# Patient Record
Sex: FEMALE | Race: WHITE
[De-identification: names, ages, dates, MRNs, and addresses within clinical notes are randomized per-mention and may not be internally consistent; named-entity substitution may affect disease eponyms.]

---

## 2018-03-20 ENCOUNTER — HOSPITAL ENCOUNTER (OUTPATIENT)
Dept: HOSPITAL 89 - RAD | Age: 38
End: 2018-03-20
Attending: ORTHOPAEDIC SURGERY
Payer: COMMERCIAL

## 2018-03-20 DIAGNOSIS — M25.552: Primary | ICD-10-CM

## 2018-03-20 NOTE — RADIOLOGY IMAGING REPORT
FACILITY: [facility] 

 

PATIENT NAME: Kamini Turner

: 1980

MR: [id number]

V: [id number]

EXAM DATE: 

ORDERING PHYSICIAN: [facility]

TECHNOLOGIST: 

 

Location: [facility]

Patient: Kamini Turner

: 1980

MRN: [id number]

Visit/Account:[id number]

Date of Sevice:  3/20/2018

 

ACCESSION #: [id number]

 

AP pelvis, one view, and left hip, one view.

 

HISTORY: Left anterior hip pain when running.

 

COMPARISON: None.

 

The bony pelvis is unremarkable. The hips are normally aligned.  No joint space narrowing.  The sacro
iliac joints and pubic symphysis are unremarkable. No acute fractures are identified.

 

IMPRESSION:

 

Negative for acute bony abnormality.

 

Report Dictated By: Jamal Key MD at 3/20/2018 1:21 PM

 

Report E-Signed By: Jamal Key MD  at 3/20/2018 1:22 PM

 

WSN:[id number]

## 2018-03-21 ENCOUNTER — HOSPITAL ENCOUNTER (OUTPATIENT)
Dept: HOSPITAL 89 - LAB | Age: 38
End: 2018-03-21
Attending: ORTHOPAEDIC SURGERY
Payer: COMMERCIAL

## 2018-03-21 ENCOUNTER — HOSPITAL ENCOUNTER (OUTPATIENT)
Dept: HOSPITAL 89 - ZZHEALTHFA | Age: 38
End: 2018-03-21

## 2018-03-21 DIAGNOSIS — Z02.9: Primary | ICD-10-CM

## 2018-03-21 DIAGNOSIS — M25.552: Primary | ICD-10-CM

## 2018-03-21 LAB
LDLC SERPL-MCNC: 76 MG/DL
PLATELET COUNT, AUTOMATED: 176 K/UL (ref 150–450)

## 2018-03-21 PROCEDURE — 82306 VITAMIN D 25 HYDROXY: CPT

## 2018-04-26 ENCOUNTER — HOSPITAL ENCOUNTER (OUTPATIENT)
Dept: HOSPITAL 89 - MRI | Age: 38
End: 2018-04-26
Attending: PHYSICIAN ASSISTANT
Payer: COMMERCIAL

## 2018-04-26 DIAGNOSIS — S73.102A: Primary | ICD-10-CM

## 2018-04-26 PROCEDURE — 77002 NEEDLE LOCALIZATION BY XRAY: CPT

## 2018-04-26 PROCEDURE — 73722 MRI JOINT OF LWR EXTR W/DYE: CPT

## 2018-04-26 NOTE — RADIOLOGY IMAGING REPORT
FACILITY: [facility] 

 

PATIENT NAME: Kamini Turner

: 1980

MR: [id number]

V: [id number]

EXAM DATE: 

ORDERING PHYSICIAN: PIETRO TURNER

TECHNOLOGIST: 

 

Location: [facility]

Patient: Kamini Turner

: 1980

MRN: [id number]

Visit/Account:[id number]

Date of Sevice:  2018

 

ACCESSION #: [id number]

 

HIP LEFT W CONTRAST

 

HISTORY: Hip pain

 

COMPARISON: None

 

TECHNIQUE: Multiplanar/multisequence was obtained through the left hip after the injection of intra-a
rticular dilute gadolinium which was performed separately from this exam.

 

FINDINGS:

 

Left hip:

There is good distention of the hip joint with dilute gadolinium.

Joint space: Normal

Bone marrow: Normal

Labrum: Short segment nondisplaced tearing of the anterior labrum (sagittal image 11). No para labral
 cyst.

Other findings: None significant

 

Limited images of the right hip:

Joint space: Normal

Bone marrow: Normal

Effusion: None

Other findings: None significant

 

Bony pelvis: Normal

Pubic symphysis and SI joints: Normal

Myotendinous structures: Normal

Intrapelvic and lower abdominal findings: None significant

Visualized spine: Normal

Other findings: None significant

 

IMPRESSION:

 

1. Left hip shows short segment nondisplaced tearing of the anterior labrum. No para labral cyst.

 

Report Dictated By: Anup Mosquera MD at 2018 12:26 PM

 

Report E-Signed By: Anup Mosquera MD  at 2018 12:30 PM

 

WSN:[id number]

## 2018-04-26 NOTE — RADIOLOGY IMAGING REPORT
FACILITY: [facility] 

 

PATIENT NAME: Kamini Turner

: 1980

MR: [id number]

V: [id number]

EXAM DATE: [id number]

ORDERING PHYSICIAN: PIETRO TURNER

TECHNOLOGIST: 

 

Location: [facility]

Patient: Kamini Turner

: 1980

MRN: [id number]

Visit/Account:[id number]

Date of Sevice:  2018

 

ACCESSION #: [id number]

 

Exam type: ARTHROGRAM PRE-MRI

 

History: Left hip labral tear, left hip pain x2 months

 

Comparison: Plain radiographs left hip 2018.

 

Findings:

 

Informed consent was obtained.  The patient's left hip was prepped and draped in usual sterile fashio
n.  Local anesthesia was accomplished with 1% lidocaine.  Under fluoroscopic guidance a 22-gauge spin
al needle was advanced percutaneously into the anterior aspect the left hip joint.  Intra-articular l
ocation of the needle was confirmed with 2 mL of Isovue 200.  Approximately 12 ML of a dilute gadolin
ium suspension was then instilled into the left hip joint under fluoroscopic guidance.  Needle was re
moved.  The procedure was accomplished without apparent complication.  The patient then went to [facility]
r further imaging.  The fluoroscopy dose area product was 92 micro-Gray per meter squared

 

IMPRESSION:

 

1.  Successful fluoroscopically guided left hip arthrogram

 

Report Dictated By: Johanny Jaeger MD at 2018 6:04 PM

 

Report E-Signed By: Johanny Jaeger MD  at 2018 6:07 PM

 

WSN:[id number]

## 2018-05-02 ENCOUNTER — HOSPITAL ENCOUNTER (OUTPATIENT)
Dept: HOSPITAL 89 - PT | Age: 38
Discharge: HOME | End: 2018-05-02
Attending: ORTHOPAEDIC SURGERY
Payer: COMMERCIAL

## 2018-05-02 DIAGNOSIS — M25.552: ICD-10-CM
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
## 2018-05-02 DIAGNOSIS — S73.102A: Primary | ICD-10-CM

## 2018-05-02 PROCEDURE — 97161 PT EVAL LOW COMPLEX 20 MIN: CPT

## 2018-05-03 NOTE — PT INITIAL EVALUATION
MEDICAL DIAGNOSIS: L hip labral tear

TREATMENT DIAGNOSIS: same

DATE OF ONSET: 02/15/18



SUBJECTIVE: Kamini Turner presents to physical therapy with L hip labral 
tear that  occurred on the 15th of February 2018 as a result of a 50 mile run. 
She  reports that she took a few weeks off following the 50 mile run; however,  
following the workout, she reported intense sharp pain on B hip flexors  down 
into her B quad musculature. Furthermore, she states that the pain  was so 
severe that she could not run. She reports that she loves to run  and has a 
hard time taking breaks from running. She reports that she can  run now but she 
experiences pain initially and then it feels great until  she finishes and then 
experiences the same B hip flexor combined with  sharp quad pain for a couple 
days following each running sessions. She  reports that she would like to 
return to running without any pain during  and following. Furthermore, she 
reports that she had some dry needling  without any results. She reports that 
running makes it worse, however, she  reports that she has been doing core and 
hip strengthening without any  increase in hip pain. She also reports that she 
can swim without any L hip  pain. She rates her current pain to be 0/10. 



REHAB PROBLEM LIST: Increased Pain

Decreased ROM

Decreased Strength

Decreased Endurance

Decreased Function





PREVIOUS MEDICAL HISTORY: See EMR

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



OBJECTIVE: 



Posture: She demonstrates normal posture mechanics in sitting and standing



ROM: L hip flexion, extension, abduction, IR, ER, extension: WNL's with normal  
end feels. 

R hip flexion, extension, abduction, IR, ER, extension: WNL's with normal  end 
feels.



Strength: L hip flexion, extension, abduction: 4/5. L hip adduction, knee 
extension  and flexion, ankle DF and PF: 5/5. R hip flexion, extension, 
abduction:  5/5. R hip adduction, knee extension and flexion, ankle DF and PF: 5
/5



Palpation: She is no longer TTP over B hip flexors and B quads



Sensation: Intact L2-S2



Special Tests: Repeated extension: stretch felt during the test and better 
following the  test. Repeated flexion: stretch felt during the test and worse 
following  the test. Repeated IR and ER: stretch felt during the test and worse
  following the test. 



Mobility: Independent



Gait: She demonstrate normal gait mechanics



ASSESSMENT: Kamini will benefit from skilled physical therapy to address the 
listed  impairments to improve function and QOL. She demonstrated a provisional
  classification as she demonstrated directional preference with L hip  flexion 
resulting in abolished L hip pain along with strength deficits with  L hip 
flexion, abduction, and extension. 



Short Term Goals

6 weeks: Pt will demonstrate improvements in core, B hip abductors, hip  
extensors, hip adductors, hamstrings, quads, gastroc/soleus, and anterior  tib 
from baseline to 4+/5 or greater to improve function and QOL. 

6 weeks: Pt will be able to return to running without any L hip pain to  
improve function and QOL. 



Patient's Goals

return to running without any pain and without any surgery



PLAN:  Patient to be seen for Manual Therapy/STM/MET

Strengthening/condition

Range of Motion

Spinal Stabilization

Work Hardening/Cond

Stretching

Neuromuscular Re-ed

Closed Chain Program

Home Exercise Program

Therapeutic Activities



2x/Week for 6 Weeks



If you have any questions, comments, or concerns about this report or plan, 
please contact me at [phone].



Thank you, 



Jovan Gutierrez, PT, DPT 
EMILYD